# Patient Record
Sex: MALE | Race: WHITE | NOT HISPANIC OR LATINO | Employment: UNEMPLOYED
[De-identification: names, ages, dates, MRNs, and addresses within clinical notes are randomized per-mention and may not be internally consistent; named-entity substitution may affect disease eponyms.]

---

## 2017-11-06 ENCOUNTER — HOSPITAL (OUTPATIENT)
Dept: OTHER | Age: 52
End: 2017-11-06
Attending: INTERNAL MEDICINE

## 2018-01-02 ENCOUNTER — HOSPITAL (OUTPATIENT)
Dept: OTHER | Age: 53
End: 2018-01-02
Attending: INTERNAL MEDICINE

## 2018-01-02 LAB
ANALYZER ANC (IANC): ABNORMAL
ANION GAP SERPL CALC-SCNC: 15 MMOL/L (ref 10–20)
BASOPHILS # BLD: 0 THOUSAND/MCL (ref 0–0.3)
BASOPHILS NFR BLD: 0 %
BUN SERPL-MCNC: 13 MG/DL (ref 6–20)
BUN/CREAT SERPL: 15 (ref 7–25)
CALCIUM SERPL-MCNC: 8.7 MG/DL (ref 8.4–10.2)
CHLORIDE: 106 MMOL/L (ref 98–107)
CO2 SERPL-SCNC: 26 MMOL/L (ref 21–32)
CREAT SERPL-MCNC: 0.87 MG/DL (ref 0.67–1.17)
DIFFERENTIAL METHOD BLD: ABNORMAL
EOSINOPHIL # BLD: 0 THOUSAND/MCL (ref 0.1–0.5)
EOSINOPHIL NFR BLD: 0 %
ERYTHROCYTE [DISTWIDTH] IN BLOOD: 13 % (ref 11–15)
GLUCOSE SERPL-MCNC: 189 MG/DL (ref 65–99)
HEMATOCRIT: 43.6 % (ref 39–51)
HGB BLD-MCNC: 15.2 GM/DL (ref 13–17)
LYMPHOCYTES # BLD: 2.2 THOUSAND/MCL (ref 1–4)
LYMPHOCYTES NFR BLD: 20 %
MCH RBC QN AUTO: 33 PG (ref 26–34)
MCHC RBC AUTO-ENTMCNC: 34.9 GM/DL (ref 32–36.5)
MCV RBC AUTO: 94.6 FL (ref 78–100)
MONOCYTES # BLD: 0.3 THOUSAND/MCL (ref 0.3–0.9)
MONOCYTES NFR BLD: 3 %
NEUTROPHILS # BLD: 8.3 THOUSAND/MCL (ref 1.8–7.7)
NEUTS BAND NFR BLD: 5 % (ref 0–10)
NEUTS SEG NFR BLD: 72 %
PATH REV BLD -IMP: ABNORMAL
PLAT MORPH BLD: NORMAL
PLATELET # BLD: 338 THOUSAND/MCL (ref 140–450)
POLYCHROMASIA (POLY): ABNORMAL
POTASSIUM SERPL-SCNC: 4.6 MMOL/L (ref 3.4–5.1)
RBC # BLD: 4.61 MILLION/MCL (ref 4.5–5.9)
SODIUM SERPL-SCNC: 142 MMOL/L (ref 135–145)
TROPONIN I SERPL HS-MCNC: <0.02 NG/ML
WBC # BLD: 11.4 THOUSAND/MCL (ref 4.2–11)
WBC MORPH BLD: NORMAL

## 2018-01-03 ENCOUNTER — DIAGNOSTIC TRANS (OUTPATIENT)
Dept: OTHER | Age: 53
End: 2018-01-03

## 2018-01-03 LAB
ANALYZER ANC (IANC): ABNORMAL
BASOPHILS # BLD: 0 THOUSAND/MCL (ref 0–0.3)
BASOPHILS NFR BLD: 0 %
CHOLEST SERPL-MCNC: 141 MG/DL
CHOLEST/HDLC SERPL: 3.5 {RATIO}
DIFFERENTIAL METHOD BLD: ABNORMAL
EOSINOPHIL # BLD: 0 THOUSAND/MCL (ref 0.1–0.5)
EOSINOPHIL NFR BLD: 0 %
ERYTHROCYTE [DISTWIDTH] IN BLOOD: 12.9 % (ref 11–15)
GLUCOSE BLDC GLUCOMTR-MCNC: 99 MG/DL (ref 65–99)
GLYCOHEMOGLOBIN: 6.4 % (ref 4.5–5.6)
HDLC SERPL-MCNC: 40 MG/DL
HEMATOCRIT: 41.6 % (ref 39–51)
HGB BLD-MCNC: 14 GM/DL (ref 13–17)
LDLC SERPL CALC-MCNC: 73 MG/DL
LYMPHOCYTES # BLD: 4.8 THOUSAND/MCL (ref 1–4)
LYMPHOCYTES NFR BLD: 39 %
MCH RBC QN AUTO: 32.1 PG (ref 26–34)
MCHC RBC AUTO-ENTMCNC: 33.7 GM/DL (ref 32–36.5)
MCV RBC AUTO: 95.4 FL (ref 78–100)
MONOCYTES # BLD: 1 THOUSAND/MCL (ref 0.3–0.9)
MONOCYTES NFR BLD: 8 %
NEUTROPHILS # BLD: 6.5 THOUSAND/MCL (ref 1.8–7.7)
NEUTS SEG NFR BLD: 53 %
NONHDLC SERPL-MCNC: 101 MG/DL
PATH REV BLD -IMP: ABNORMAL
PLAT MORPH BLD: NORMAL
PLATELET # BLD: 263 THOUSAND/MCL (ref 140–450)
RBC # BLD: 4.36 MILLION/MCL (ref 4.5–5.9)
RBC MORPH BLD: NORMAL
TRIGLYCERIDE (TRIGP): 140 MG/DL
TROPONIN I SERPL HS-MCNC: <0.02 NG/ML
TROPONIN I SERPL HS-MCNC: <0.02 NG/ML
WBC # BLD: 12.2 THOUSAND/MCL (ref 4.2–11)
WBC MORPH BLD: NORMAL

## 2018-07-18 PROBLEM — B35.3 TINEA PEDIS OF BOTH FEET: Status: ACTIVE | Noted: 2018-07-18

## 2018-07-18 PROBLEM — E11.9 TYPE 2 DIABETES MELLITUS WITHOUT COMPLICATION, WITHOUT LONG-TERM CURRENT USE OF INSULIN (HCC): Status: ACTIVE | Noted: 2018-07-18

## 2018-07-18 PROBLEM — E78.5 HYPERLIPIDEMIA LDL GOAL <100: Status: ACTIVE | Noted: 2018-07-18

## 2018-07-18 PROBLEM — G47.33 OSA ON CPAP: Status: ACTIVE | Noted: 2018-07-18

## 2018-07-18 PROBLEM — Z99.89 OSA ON CPAP: Status: ACTIVE | Noted: 2018-07-18

## 2018-07-18 PROBLEM — I10 ESSENTIAL HYPERTENSION: Status: ACTIVE | Noted: 2018-07-18

## 2018-07-18 PROBLEM — Z00.00 ROUTINE GENERAL MEDICAL EXAMINATION AT A HEALTH CARE FACILITY: Status: ACTIVE | Noted: 2018-07-18

## 2018-07-18 PROCEDURE — 83036 HEMOGLOBIN GLYCOSYLATED A1C: CPT | Performed by: INTERNAL MEDICINE

## 2018-07-18 PROCEDURE — 84550 ASSAY OF BLOOD/URIC ACID: CPT | Performed by: INTERNAL MEDICINE

## 2018-07-18 PROCEDURE — 82570 ASSAY OF URINE CREATININE: CPT | Performed by: INTERNAL MEDICINE

## 2018-07-18 PROCEDURE — 36415 COLL VENOUS BLD VENIPUNCTURE: CPT | Performed by: INTERNAL MEDICINE

## 2018-07-18 PROCEDURE — 83721 ASSAY OF BLOOD LIPOPROTEIN: CPT | Performed by: INTERNAL MEDICINE

## 2018-07-18 PROCEDURE — 80061 LIPID PANEL: CPT | Performed by: INTERNAL MEDICINE

## 2018-07-18 PROCEDURE — 80050 GENERAL HEALTH PANEL: CPT | Performed by: INTERNAL MEDICINE

## 2018-07-18 PROCEDURE — 81003 URINALYSIS AUTO W/O SCOPE: CPT | Performed by: INTERNAL MEDICINE

## 2018-07-18 PROCEDURE — 82043 UR ALBUMIN QUANTITATIVE: CPT | Performed by: INTERNAL MEDICINE

## 2018-07-18 PROCEDURE — 84153 ASSAY OF PSA TOTAL: CPT | Performed by: INTERNAL MEDICINE

## 2018-09-06 PROBLEM — H90.42 SENSORINEURAL HEARING LOSS (SNHL) OF LEFT EAR WITH UNRESTRICTED HEARING OF RIGHT EAR: Status: ACTIVE | Noted: 2018-09-06

## 2018-09-15 ENCOUNTER — HOSPITAL (OUTPATIENT)
Dept: OTHER | Age: 53
End: 2018-09-15
Attending: EMERGENCY MEDICINE

## 2018-09-15 LAB
ANALYZER ANC (IANC): NORMAL
BASOPHILS # BLD: 0 THOUSAND/MCL (ref 0–0.3)
BASOPHILS NFR BLD: 0 %
DIFFERENTIAL METHOD BLD: NORMAL
EOSINOPHIL # BLD: 0.5 THOUSAND/MCL (ref 0.1–0.5)
EOSINOPHIL NFR BLD: 5 %
ERYTHROCYTE [DISTWIDTH] IN BLOOD: 12.5 % (ref 11–15)
HEMATOCRIT: 41.4 % (ref 39–51)
HGB BLD-MCNC: 14.8 GM/DL (ref 13–17)
LYMPHOCYTES # BLD: 3.6 THOUSAND/MCL (ref 1–4)
LYMPHOCYTES NFR BLD: 38 %
MCH RBC QN AUTO: 32.7 PG (ref 26–34)
MCHC RBC AUTO-ENTMCNC: 35.7 GM/DL (ref 32–36.5)
MCV RBC AUTO: 91.4 FL (ref 78–100)
MONOCYTES # BLD: 0.7 THOUSAND/MCL (ref 0.3–0.9)
MONOCYTES NFR BLD: 7 %
NEUTROPHILS # BLD: 4.7 THOUSAND/MCL (ref 1.8–7.7)
NEUTROPHILS NFR BLD: 50 %
NEUTS SEG NFR BLD: NORMAL %
NRBC (NRBCRE): NORMAL
PLATELET # BLD: 291 THOUSAND/MCL (ref 140–450)
RBC # BLD: 4.53 MILLION/MCL (ref 4.5–5.9)
WBC # BLD: 9.4 THOUSAND/MCL (ref 4.2–11)

## 2019-01-14 RX ORDER — ALBUTEROL SULFATE 90 UG/1
1 AEROSOL, METERED RESPIRATORY (INHALATION) EVERY 6 HOURS PRN
COMMUNITY
End: 2019-03-28

## 2019-01-17 ENCOUNTER — ANESTHESIA EVENT (OUTPATIENT)
Dept: ENDOSCOPY | Facility: HOSPITAL | Age: 54
End: 2019-01-17
Payer: COMMERCIAL

## 2019-01-18 ENCOUNTER — ANESTHESIA (OUTPATIENT)
Dept: ENDOSCOPY | Facility: HOSPITAL | Age: 54
End: 2019-01-18
Payer: COMMERCIAL

## 2019-01-18 ENCOUNTER — HOSPITAL ENCOUNTER (OUTPATIENT)
Facility: HOSPITAL | Age: 54
Setting detail: HOSPITAL OUTPATIENT SURGERY
Discharge: HOME | End: 2019-01-18
Attending: INTERNAL MEDICINE | Admitting: INTERNAL MEDICINE
Payer: COMMERCIAL

## 2019-01-18 VITALS
SYSTOLIC BLOOD PRESSURE: 141 MMHG | HEIGHT: 71 IN | BODY MASS INDEX: 39.76 KG/M2 | DIASTOLIC BLOOD PRESSURE: 85 MMHG | HEART RATE: 59 BPM | OXYGEN SATURATION: 98 % | RESPIRATION RATE: 18 BRPM | WEIGHT: 284 LBS

## 2019-01-18 DIAGNOSIS — K92.2 ACUTE GASTROINTESTINAL BLEEDING: ICD-10-CM

## 2019-01-18 LAB — GLUCOSE BLD-MCNC: 139 MG/DL (ref 65–99)

## 2019-01-18 PROCEDURE — 0DBP8ZX EXCISION OF RECTUM, VIA NATURAL OR ARTIFICIAL OPENING ENDOSCOPIC, DIAGNOSTIC: ICD-10-PCS | Performed by: INTERNAL MEDICINE

## 2019-01-18 PROCEDURE — 0DBN8ZX EXCISION OF SIGMOID COLON, VIA NATURAL OR ARTIFICIAL OPENING ENDOSCOPIC, DIAGNOSTIC: ICD-10-PCS | Performed by: INTERNAL MEDICINE

## 2019-01-18 PROCEDURE — 88305 TISSUE EXAM BY PATHOLOGIST: CPT | Performed by: INTERNAL MEDICINE

## 2019-01-18 PROCEDURE — 82962 GLUCOSE BLOOD TEST: CPT

## 2019-01-18 RX ORDER — DEXTROSE MONOHYDRATE 25 G/50ML
50 INJECTION, SOLUTION INTRAVENOUS
Status: DISCONTINUED | OUTPATIENT
Start: 2019-01-18 | End: 2019-01-18

## 2019-01-18 RX ORDER — SODIUM CHLORIDE, SODIUM LACTATE, POTASSIUM CHLORIDE, CALCIUM CHLORIDE 600; 310; 30; 20 MG/100ML; MG/100ML; MG/100ML; MG/100ML
INJECTION, SOLUTION INTRAVENOUS CONTINUOUS
Status: DISCONTINUED | OUTPATIENT
Start: 2019-01-18 | End: 2019-01-18

## 2019-01-18 NOTE — OPERATIVE REPORT
PATIENT NAME: Vamsi Moreira  MRN: [id number]  DATE OF OPERATION: 1/18/19  REFERRING PHYSICIAN: Dr. Jimbo Wise  Medications:  MAC  Date of last COLONOSCOPY:  2015  PREOPERATIVE DIAGNOSIS                personal history of colon polyps (2015)  POSTOPERATIVE IRWIN was 10 minutes. RECOMMENDATIONS         1. The patient will be provided with a written summary of today's endoscopic findings. 2. Fiber supplementation. 3. The patient will be notified with biopsy results in 1 - 2 weeks.          4. Recomme

## 2019-01-18 NOTE — H&P
Gastroenterology H and P  By Dr. Jania Lay  CC: personal history of colon polyps     HPI:  Tasneem Carrington is a 52year old male. Consult requested by Dr. Dong ref. provider found for a personal history of colon polyps.   Patient's last colonoscopy 2015, nota be held down during Colonoscopy   • COPD (chronic obstructive pulmonary disease) (HCC)    • Diabetes ([facility] [address]    • High blood pressure    • Visual impairment     reading glasses     Past Surgical History:   Procedure Laterality Date   • COLONOSCOPY     • TONS colonoscopy was 2015. Repeat colonoscopy indicated. Plan:  Colonoscopy will be scheduled within the next month or at the patient's earliest convenience. The risks of perforation, bleeding, and missed polyps or cancer were reviewed with the patient.

## 2019-01-18 NOTE — BRIEF OP NOTE
Pre-Operative Diagnosis: Acute gastrointestinal bleeding [K92.2]     Post-Operative Diagnosis: Colon polyp, diverticulosis, hemorrhoids      Procedure Performed:   Procedure(s):  COLONOSCOPY with cold snare polypectomy    Surgeon(s) and Role:     * Wendy

## 2019-01-18 NOTE — ANESTHESIA PREPROCEDURE EVALUATION
PRE-OP EVALUATION    Patient Name: Ellen Xiao    Pre-op Diagnosis: Acute gastrointestinal bleeding [K92.2]    Procedure(s):  COLONOSCOPY    Surgeon(s) and Role:     * Michele Atkinson MD - Primary    Pre-op vitals reviewed. Body mass index is 40. 4 Drug use: No     Available pre-op labs reviewed. Airway      Mallampati: III  Mouth opening: >3 FB  TM distance: 4 - 6 cm  Neck ROM: full Cardiovascular    Cardiovascular exam normal.         Dental    No notable dental history.

## 2019-01-18 NOTE — ANESTHESIA POSTPROCEDURE EVALUATION
[facility] [place]:  Hospital Outpatient Surgery   Age/Gender 52year old male MRN [id number]   Location [address] [place] [address]. Attending Tasha Gaytan MD   Hosp Day # 0 PCP Lakeisha Sánchez MD       Anesthesia Post-op Note

## 2019-04-17 ENCOUNTER — APPOINTMENT (OUTPATIENT)
Dept: GENERAL RADIOLOGY | Age: 54
End: 2019-04-17
Attending: FAMILY MEDICINE
Payer: COMMERCIAL

## 2019-04-17 ENCOUNTER — HOSPITAL ENCOUNTER (OUTPATIENT)
Age: 54
Discharge: HOME | End: 2019-04-17
Attending: FAMILY MEDICINE
Payer: COMMERCIAL

## 2019-04-17 VITALS
OXYGEN SATURATION: 99 % | RESPIRATION RATE: 20 BRPM | DIASTOLIC BLOOD PRESSURE: 79 MMHG | TEMPERATURE: 99 F | BODY MASS INDEX: 40.6 KG/M2 | WEIGHT: 290 LBS | HEART RATE: 71 BPM | HEIGHT: 71 IN | SYSTOLIC BLOOD PRESSURE: 139 MMHG

## 2019-04-17 DIAGNOSIS — M79.642 LEFT HAND PAIN: Primary | ICD-10-CM

## 2019-04-17 PROCEDURE — 36415 COLL VENOUS BLD VENIPUNCTURE: CPT

## 2019-04-17 PROCEDURE — 99203 OFFICE O/P NEW LOW 30 MIN: CPT

## 2019-04-17 PROCEDURE — 85025 COMPLETE CBC W/AUTO DIFF WBC: CPT | Performed by: FAMILY MEDICINE

## 2019-04-17 PROCEDURE — 73130 X-RAY EXAM OF HAND: CPT | Performed by: FAMILY MEDICINE

## 2019-04-17 RX ORDER — ACETAMINOPHEN 500 MG
1000 TABLET ORAL ONCE
Status: COMPLETED | OUTPATIENT
Start: 2019-04-17 | End: 2019-04-17

## 2019-04-17 RX ORDER — CEPHALEXIN 500 MG/1
500 CAPSULE ORAL 2 TIMES DAILY
Qty: 14 CAPSULE | Refills: 0 | Status: SHIPPED | OUTPATIENT
Start: 2019-04-17 | End: 2019-04-24

## 2019-04-17 NOTE — ED PROVIDER NOTES
Patient Seen in: [facility]    History   Patient presents with:  Hand Pain    Stated Complaint: l-hand pain    HPI  49yo M with a PMHx sig for Gastritis, COPD, Diabetes and HTN presents to IC with acute onset of pain and He appears well-developed. No distress. Cardiovascular: Normal rate and regular rhythm.    Pulmonary/Chest: Effort normal and breath sounds normal.   Musculoskeletal:        Left hand: He exhibits decreased range of motion (has full ROM of 2nd MCP but not for infectious tenosynovitis but will start on Keflex as a precaution and emphasized importance of f/u with Ortho tomorrow. Given contact info along with  if difficulty getting an appointment tomorrow. Continue with Tylenol.   If any new or wors

## 2019-04-17 NOTE — ED INITIAL ASSESSMENT (HPI)
Woke up this morning with pain to left hand at 2nd MCP joint. Feels \"hot\". Painful ROM. Swelling present. No trauma.

## 2019-04-28 PROBLEM — J45.20 MILD INTERMITTENT EXTRINSIC ASTHMA WITHOUT COMPLICATION: Status: ACTIVE | Noted: 2019-04-28

## 2019-04-28 PROBLEM — J32.4 CHRONIC PANSINUSITIS: Status: ACTIVE | Noted: 2019-04-28

## 2020-08-04 ENCOUNTER — HOSPITAL ENCOUNTER (OUTPATIENT)
Age: 55
Discharge: HOME | End: 2020-08-04
Payer: COMMERCIAL

## 2020-08-04 ENCOUNTER — APPOINTMENT (OUTPATIENT)
Dept: GENERAL RADIOLOGY | Age: 55
End: 2020-08-04
Attending: NURSE PRACTITIONER
Payer: COMMERCIAL

## 2020-08-04 VITALS
TEMPERATURE: 99 F | SYSTOLIC BLOOD PRESSURE: 152 MMHG | DIASTOLIC BLOOD PRESSURE: 97 MMHG | RESPIRATION RATE: 20 BRPM | OXYGEN SATURATION: 98 % | HEART RATE: 103 BPM

## 2020-08-04 DIAGNOSIS — R06.02 SHORTNESS OF BREATH: ICD-10-CM

## 2020-08-04 DIAGNOSIS — Z20.822 ENCOUNTER FOR SCREENING LABORATORY TESTING FOR COVID-19 VIRUS: Primary | ICD-10-CM

## 2020-08-04 PROCEDURE — 99214 OFFICE O/P EST MOD 30 MIN: CPT

## 2020-08-04 PROCEDURE — 99213 OFFICE O/P EST LOW 20 MIN: CPT

## 2020-08-04 PROCEDURE — 71046 X-RAY EXAM CHEST 2 VIEWS: CPT | Performed by: NURSE PRACTITIONER

## 2020-08-04 RX ORDER — ACETAMINOPHEN 500 MG
1000 TABLET ORAL ONCE
Status: COMPLETED | OUTPATIENT
Start: 2020-08-04 | End: 2020-08-04

## 2020-08-04 NOTE — ED PROVIDER NOTES
Patient Seen in: [address]      History   Patient presents with:  Breathing Problem    Stated Complaint: Copd difficulty breathing ha     HPI    This is a 49-year-old male with past medical history of COPD, sleep apnea, Copd difficulty breathing ha   Other systems are as noted in HPI. Constitutional and vital signs reviewed. All other systems reviewed and negative except as noted above.     Physical Exam     ED Triage Vitals [08/04/20 0813]   BP (!) 152/97   Pulse 10 diagnosis  COVID-19 infection versus bacterial viral pneumonia versus a viral syndrome. 76-year-old male nontoxic-appearing low-grade temp slightly tachycardic not hypoxic no respiratory distress presenting with shortness of breath.   Patient is on his l

## 2020-08-04 NOTE — ED INITIAL ASSESSMENT (HPI)
PATIENT ARRIVED AMBULATORY TO ROOM C/O SYMPTOMS THAT STARTED 1 WEEK AGO. PATIENT STATES HE SPOKE WITH HIS PULMONOLOGIST AND WAS PLACED ON A ZPAK. PATIENT STATES \"I FELT BETTER BUT THEN IT GOT BAD. MY DOCTOR TOLD ME TO COME GET TESTED\" +SOB. +HEADACHES.  +

## 2020-08-05 LAB — SARS-COV-2 RNA RESP QL NAA+PROBE: NOT DETECTED

## 2021-03-24 ENCOUNTER — HOSPITAL ENCOUNTER (OUTPATIENT)
Age: 56
Discharge: OTHER INSTITUTION | End: 2021-03-24
Attending: EMERGENCY MEDICINE
Payer: COMMERCIAL

## 2021-03-24 ENCOUNTER — HOSPITAL ENCOUNTER (EMERGENCY)
Age: 56
Discharge: HOME | End: 2021-03-24
Attending: EMERGENCY MEDICINE

## 2021-03-24 ENCOUNTER — APPOINTMENT (OUTPATIENT)
Dept: GENERAL RADIOLOGY | Age: 56
End: 2021-03-24
Attending: EMERGENCY MEDICINE

## 2021-03-24 ENCOUNTER — APPOINTMENT (OUTPATIENT)
Dept: CT IMAGING | Age: 56
End: 2021-03-24
Attending: EMERGENCY MEDICINE
Payer: COMMERCIAL

## 2021-03-24 VITALS
SYSTOLIC BLOOD PRESSURE: 128 MMHG | RESPIRATION RATE: 14 BRPM | TEMPERATURE: 98.1 F | OXYGEN SATURATION: 97 % | WEIGHT: 293.65 LBS | HEART RATE: 83 BPM | DIASTOLIC BLOOD PRESSURE: 74 MMHG

## 2021-03-24 VITALS
OXYGEN SATURATION: 98 % | HEART RATE: 74 BPM | RESPIRATION RATE: 18 BRPM | TEMPERATURE: 98 F | DIASTOLIC BLOOD PRESSURE: 80 MMHG | SYSTOLIC BLOOD PRESSURE: 142 MMHG

## 2021-03-24 DIAGNOSIS — E86.0 DEHYDRATION: ICD-10-CM

## 2021-03-24 DIAGNOSIS — K52.9 AGE (ACUTE GASTROENTERITIS): Primary | ICD-10-CM

## 2021-03-24 DIAGNOSIS — R10.9 ABDOMINAL PAIN OF UNKNOWN ETIOLOGY: ICD-10-CM

## 2021-03-24 DIAGNOSIS — R55 SYNCOPE AND COLLAPSE: Primary | ICD-10-CM

## 2021-03-24 LAB
#MXD IC: 1.1 X10ˆ3/UL (ref 0.1–1)
ALBUMIN SERPL-MCNC: 3.7 G/DL (ref 3.6–5.1)
ALBUMIN/GLOB SERPL: 1 {RATIO} (ref 1–2.4)
ALP SERPL-CCNC: 64 UNITS/L (ref 45–117)
ALT SERPL-CCNC: 59 UNITS/L
ANION GAP SERPL CALC-SCNC: 12 MMOL/L (ref 10–20)
APPEARANCE UR: CLEAR
AST SERPL-CCNC: 33 UNITS/L
ATRIAL RATE (BPM): 72
BACTERIA #/AREA URNS HPF: ABNORMAL /HPF
BASOPHILS # BLD: 0 K/MCL (ref 0–0.3)
BASOPHILS NFR BLD: 0 %
BILIRUB SERPL-MCNC: 0.4 MG/DL (ref 0.2–1)
BILIRUB UR QL STRIP: NEGATIVE
BILIRUB UR QL STRIP: NEGATIVE
BUN SERPL-MCNC: 14 MG/DL (ref 6–20)
BUN/CREAT SERPL: 17 (ref 7–25)
CALCIUM SERPL-MCNC: 8.4 MG/DL (ref 8.4–10.2)
CHLORIDE SERPL-SCNC: 105 MMOL/L (ref 98–107)
CLARITY UR: CLEAR
CO2 SERPL-SCNC: 24 MMOL/L (ref 21–32)
COLOR UR: YELLOW
CREAT BLD-MCNC: 0.8 MG/DL
CREAT SERPL-MCNC: 0.82 MG/DL (ref 0.67–1.17)
DEPRECATED RDW RBC: 41.8 FL (ref 39–50)
EOSINOPHIL # BLD: 0 K/MCL (ref 0–0.5)
EOSINOPHIL NFR BLD: 0 %
ERYTHROCYTE [DISTWIDTH] IN BLOOD: 12.3 % (ref 11–15)
FASTING DURATION TIME PATIENT: ABNORMAL H
GFR SERPLBLD BASED ON 1.73 SQ M-ARVRAT: >90 ML/MIN/1.73M2
GLOBULIN SER-MCNC: 3.8 G/DL (ref 2–4)
GLUCOSE BLD-MCNC: 151 MG/DL (ref 70–99)
GLUCOSE BLDC GLUCOMTR-MCNC: 126 MG/DL (ref 70–99)
GLUCOSE SERPL-MCNC: 210 MG/DL (ref 65–99)
GLUCOSE UR STRIP-MCNC: NEGATIVE MG/DL
GLUCOSE UR STRIP-MCNC: NEGATIVE MG/DL
HCT VFR BLD AUTO: 44.2 %
HCT VFR BLD CALC: 43 % (ref 39–51)
HGB BLD-MCNC: 14.7 G/DL (ref 13–17)
HGB BLD-MCNC: 15.2 G/DL
HGB UR QL STRIP: NEGATIVE
HGB UR QL STRIP: NEGATIVE
HYALINE CASTS #/AREA URNS LPF: ABNORMAL /LPF
IMM GRANULOCYTES # BLD AUTO: 0.1 K/MCL (ref 0–0.2)
IMM GRANULOCYTES # BLD: 1 %
ISTAT BUN: 13 MG/DL (ref 7–18)
ISTAT CHLORIDE: 100 MMOL/L (ref 98–112)
ISTAT HEMATOCRIT: 48 %
ISTAT IONIZED CALCIUM FOR CHEM 8: 1.2 MMOL/L (ref 1.12–1.32)
ISTAT POTASSIUM: 4 MMOL/L (ref 3.6–5.1)
ISTAT SODIUM: 140 MMOL/L (ref 136–145)
ISTAT TCO2: 28 MMOL/L (ref 21–32)
KETONES UR STRIP-MCNC: 80 MG/DL
KETONES UR STRIP-MCNC: NEGATIVE MG/DL
LEUKOCYTE ESTERASE UR QL STRIP: ABNORMAL
LEUKOCYTE ESTERASE UR QL STRIP: NEGATIVE
LIPASE SERPL-CCNC: 59 UNITS/L (ref 73–393)
LYMPHOCYTES # BLD AUTO: 4.6 X10ˆ3/UL (ref 1–4)
LYMPHOCYTES # BLD: 1.9 K/MCL (ref 1–4)
LYMPHOCYTES NFR BLD AUTO: 33.8 %
LYMPHOCYTES NFR BLD: 12 %
MAGNESIUM SERPL-MCNC: 1.7 MG/DL (ref 1.7–2.4)
MCH RBC QN AUTO: 31.5 PG (ref 26–34)
MCH RBC QN AUTO: 31.5 PG (ref 26–34)
MCHC RBC AUTO-ENTMCNC: 34.2 G/DL (ref 32–36.5)
MCHC RBC AUTO-ENTMCNC: 34.4 G/DL (ref 31–37)
MCV RBC AUTO: 91.5 FL (ref 80–100)
MCV RBC AUTO: 92.1 FL (ref 78–100)
MIXED CELL %: 8.3 %
MONOCYTES # BLD: 0.9 K/MCL (ref 0.3–0.9)
MONOCYTES NFR BLD: 6 %
MUCOUS THREADS URNS QL MICRO: PRESENT
NEUTROPHILS # BLD AUTO: 7.9 X10ˆ3/UL (ref 1.5–7.7)
NEUTROPHILS # BLD: 12.5 K/MCL (ref 1.8–7.7)
NEUTROPHILS NFR BLD AUTO: 57.9 %
NEUTROPHILS NFR BLD: 81 %
NITRITE UR QL STRIP: NEGATIVE
NITRITE UR QL STRIP: NEGATIVE
NRBC BLD MANUAL-RTO: 0 /100 WBC
P AXIS (DEGREES): 64
PH UR STRIP: 5 [PH] (ref 5–7)
PH UR STRIP: 5.5 [PH]
PLATELET # BLD AUTO: 314 K/MCL (ref 140–450)
PLATELET # BLD AUTO: 316 X10ˆ3/UL (ref 150–450)
POTASSIUM SERPL-SCNC: 3.6 MMOL/L (ref 3.4–5.1)
PR-INTERVAL (MSEC): 176
PROT SERPL-MCNC: 7.5 G/DL (ref 6.4–8.2)
PROT UR STRIP-MCNC: 30 MG/DL
PROT UR STRIP-MCNC: NEGATIVE MG/DL
QRS-INTERVAL (MSEC): 98
QT-INTERVAL (MSEC): 434
QTC: 475
R AXIS (DEGREES): 0
RBC # BLD AUTO: 4.83 X10ˆ6/UL
RBC # BLD: 4.67 MIL/MCL (ref 4.5–5.9)
RBC #/AREA URNS HPF: ABNORMAL /HPF
REPORT TEXT: NORMAL
SODIUM SERPL-SCNC: 137 MMOL/L (ref 135–145)
SP GR UR STRIP: 1.02
SP GR UR STRIP: 1.02 (ref 1–1.03)
SQUAMOUS #/AREA URNS HPF: ABNORMAL /HPF
T AXIS (DEGREES): 32
TROPONIN I SERPL HS-MCNC: <0.02 NG/ML
TROPONIN I SERPL HS-MCNC: <0.02 NG/ML
UROBILINOGEN UR STRIP-ACNC: <2 MG/DL
UROBILINOGEN UR STRIP-MCNC: 0.2 MG/DL
VENTRICULAR RATE EKG/MIN (BPM): 72
WBC # BLD AUTO: 13.6 X10ˆ3/UL (ref 4–11)
WBC # BLD: 15.5 K/MCL (ref 4.2–11)
WBC #/AREA URNS HPF: ABNORMAL /HPF

## 2021-03-24 PROCEDURE — 93005 ELECTROCARDIOGRAM TRACING: CPT

## 2021-03-24 PROCEDURE — C9803 HOPD COVID-19 SPEC COLLECT: HCPCS

## 2021-03-24 PROCEDURE — 93010 ELECTROCARDIOGRAM REPORT: CPT

## 2021-03-24 PROCEDURE — 83690 ASSAY OF LIPASE: CPT | Performed by: EMERGENCY MEDICINE

## 2021-03-24 PROCEDURE — 85025 COMPLETE CBC W/AUTO DIFF WBC: CPT | Performed by: EMERGENCY MEDICINE

## 2021-03-24 PROCEDURE — 82962 GLUCOSE BLOOD TEST: CPT

## 2021-03-24 PROCEDURE — 93010 ELECTROCARDIOGRAM REPORT: CPT | Performed by: EMERGENCY MEDICINE

## 2021-03-24 PROCEDURE — U0003 INFECTIOUS AGENT DETECTION BY NUCLEIC ACID (DNA OR RNA); SEVERE ACUTE RESPIRATORY SYNDROME CORONAVIRUS 2 (SARS-COV-2) (CORONAVIRUS DISEASE [COVID-19]), AMPLIFIED PROBE TECHNIQUE, MAKING USE OF HIGH THROUGHPUT TECHNOLOGIES AS DESCRIBED BY CMS-2020-01-R: HCPCS | Performed by: EMERGENCY MEDICINE

## 2021-03-24 PROCEDURE — 72193 CT PELVIS W/DYE: CPT | Performed by: EMERGENCY MEDICINE

## 2021-03-24 PROCEDURE — 81002 URINALYSIS NONAUTO W/O SCOPE: CPT

## 2021-03-24 PROCEDURE — 10002800 HB RX 250 W HCPCS: Performed by: EMERGENCY MEDICINE

## 2021-03-24 PROCEDURE — 80053 COMPREHEN METABOLIC PANEL: CPT | Performed by: EMERGENCY MEDICINE

## 2021-03-24 PROCEDURE — 71045 X-RAY EXAM CHEST 1 VIEW: CPT

## 2021-03-24 PROCEDURE — 10002803 HB RX 637: Performed by: EMERGENCY MEDICINE

## 2021-03-24 PROCEDURE — 84484 ASSAY OF TROPONIN QUANT: CPT | Performed by: EMERGENCY MEDICINE

## 2021-03-24 PROCEDURE — 81001 URINALYSIS AUTO W/SCOPE: CPT | Performed by: EMERGENCY MEDICINE

## 2021-03-24 PROCEDURE — 83735 ASSAY OF MAGNESIUM: CPT | Performed by: EMERGENCY MEDICINE

## 2021-03-24 PROCEDURE — 93005 ELECTROCARDIOGRAM TRACING: CPT | Performed by: EMERGENCY MEDICINE

## 2021-03-24 PROCEDURE — 10002807 HB RX 258: Performed by: EMERGENCY MEDICINE

## 2021-03-24 PROCEDURE — 99215 OFFICE O/P EST HI 40 MIN: CPT

## 2021-03-24 PROCEDURE — 80047 BASIC METABLC PNL IONIZED CA: CPT

## 2021-03-24 PROCEDURE — 87086 URINE CULTURE/COLONY COUNT: CPT | Performed by: EMERGENCY MEDICINE

## 2021-03-24 PROCEDURE — 99284 EMERGENCY DEPT VISIT MOD MDM: CPT

## 2021-03-24 PROCEDURE — 96361 HYDRATE IV INFUSION ADD-ON: CPT

## 2021-03-24 PROCEDURE — 96374 THER/PROPH/DIAG INJ IV PUSH: CPT

## 2021-03-24 PROCEDURE — 99214 OFFICE O/P EST MOD 30 MIN: CPT

## 2021-03-24 RX ORDER — SODIUM CHLORIDE 9 MG/ML
1000 INJECTION, SOLUTION INTRAVENOUS ONCE
Status: COMPLETED | OUTPATIENT
Start: 2021-03-24 | End: 2021-03-24

## 2021-03-24 RX ORDER — ONDANSETRON 4 MG/1
4 TABLET, ORALLY DISINTEGRATING ORAL EVERY 8 HOURS PRN
Qty: 12 TABLET | Refills: 0 | Status: SHIPPED | OUTPATIENT
Start: 2021-03-24

## 2021-03-24 RX ORDER — ONDANSETRON 2 MG/ML
4 INJECTION INTRAMUSCULAR; INTRAVENOUS ONCE
Status: COMPLETED | OUTPATIENT
Start: 2021-03-24 | End: 2021-03-24

## 2021-03-24 RX ORDER — KETOROLAC TROMETHAMINE 30 MG/ML
15 INJECTION, SOLUTION INTRAMUSCULAR; INTRAVENOUS ONCE
Status: COMPLETED | OUTPATIENT
Start: 2021-03-24 | End: 2021-03-24

## 2021-03-24 RX ORDER — LORATADINE 10 MG/1
10 TABLET ORAL ONCE
Status: COMPLETED | OUTPATIENT
Start: 2021-03-24 | End: 2021-03-24

## 2021-03-24 RX ORDER — SODIUM CHLORIDE 9 MG/ML
INJECTION, SOLUTION INTRAVENOUS CONTINUOUS
Status: DISCONTINUED | OUTPATIENT
Start: 2021-03-24 | End: 2021-03-24 | Stop reason: HOSPADM

## 2021-03-24 RX ADMIN — SODIUM CHLORIDE: 0.9 INJECTION, SOLUTION INTRAVENOUS at 17:36

## 2021-03-24 RX ADMIN — LORATADINE 10 MG: 10 TABLET ORAL at 19:03

## 2021-03-24 RX ADMIN — ONDANSETRON 4 MG: 2 INJECTION INTRAMUSCULAR; INTRAVENOUS at 17:36

## 2021-03-24 RX ADMIN — SODIUM CHLORIDE 1000 ML: 0.9 INJECTION, SOLUTION INTRAVENOUS at 17:36

## 2021-03-24 ASSESSMENT — ENCOUNTER SYMPTOMS
HEMATOLOGIC/LYMPHATIC NEGATIVE: 1
ALLERGIC/IMMUNOLOGIC NEGATIVE: 1
ABDOMINAL PAIN: 1
PSYCHIATRIC NEGATIVE: 1
CONSTITUTIONAL NEGATIVE: 1
COUGH: 1
RHINORRHEA: 1
DIARRHEA: 1
NEUROLOGICAL NEGATIVE: 1
EYES NEGATIVE: 1
VOMITING: 1

## 2021-03-24 ASSESSMENT — PAIN SCALES - GENERAL: PAINLEVEL_OUTOF10: 2

## 2021-03-24 NOTE — ED NOTES
Medics here. Patient transported to [address] [place] [address] ED for further evaluation.

## 2021-03-24 NOTE — ED PROVIDER NOTES
Patient Seen in: [facility]      History   Patient presents with:  Abdominal Pain    Stated Complaint: diverticulitis acting up    HPI/Subjective:   HPI    66-year-old male presents for evaluation for diverticulitis.   Patient states that for Exam     ED Triage Vitals [03/24/21 1251]   /81   Pulse 81   Resp 18   Temp 97.5 °F (36.4 °C)   Temp src Oral   SpO2 98 %   O2 Device None (Room air)       Current:/81   Pulse 81   Temp 97.5 °F (36.4 °C) (Oral)   Resp 18   SpO2 98%         Phys Sinus Rhythm  Reading: No STEMI. Interpreted by ED physician. MDM    CBC with leukocytosis. BMP unremarkable. Urinalysis unremarkable.   Patient was unable to tolerate the full CT abdomen pelvis given his claustrophobia so only a CT pelvis was attenuation seen at the edge of the field of view. Calcified gallstone.   1.1 cm and a 2.5 cm simple right renal cyst.  A incompletely characterized hypoattenuating (33 Hounsfield units) exophytic anterior left lower pole renal lesion measuring 1 cm and ha information from the family, and speaking with consultants, admitting doctors, nurses and medics and excludes any time spent on procedures.                                    Disposition and Plan     Clinical Impression:  Syncope and collapse  (primary enco

## 2021-03-24 NOTE — ED INITIAL ASSESSMENT (HPI)
PATIENT ARRIVED AMBULATORY TO ROOM C/O LLQ ABDOMINAL PAIN THAT STARTED 2 DAYS AGO. PATIENT STATES HE HAS H/O DIVERTICULITIS, LAST ATTACK WAS 7 YEARS AND PATIENT WAS HOSPITALIZED. NO FEVERS. NO N/V/D. NO DISTRESS.

## 2021-03-24 NOTE — ED NOTES
Patient climbing off of cart stating his \"sugar is dropping rapidly\". Feels lightheaded, diaphoretic, and pale. Assisted to cart. Accu check = 126. Repeatedly stating he knows his sugar is low. HR= 50. Dr. De La O Certain to bedside.  Patient became nauseated a

## 2021-03-25 LAB
RAINBOW EXTRA TUBES HOLD SPECIMEN: NORMAL
SARS-COV-2 RNA RESP QL NAA+PROBE: NOT DETECTED
SERVICE CMNT-IMP: NORMAL
SERVICE CMNT-IMP: NORMAL

## 2021-03-26 LAB — BACTERIA UR CULT: NORMAL

## 2021-12-01 PROBLEM — J01.01 ACUTE RECURRENT MAXILLARY SINUSITIS: Status: ACTIVE | Noted: 2021-12-01

## 2021-12-01 PROBLEM — H92.09 EAR ACHE: Status: ACTIVE | Noted: 2021-12-01

## (undated) DEVICE — 1200CC GUARDIAN II: Brand: GUARDIAN

## (undated) DEVICE — SNARE CAPTIFLEX MICRO-OVL OLY

## (undated) DEVICE — FILTERLINE NASAL ADULT O2/CO2

## (undated) DEVICE — 3M™ RED DOT™ MONITORING ELECTRODE WITH FOAM TAPE AND STICKY GEL, 50/BAG, 20/CASE, 72/PLT 2570: Brand: RED DOT™

## (undated) DEVICE — ENDOSCOPY PACK - LOWER: Brand: MEDLINE INDUSTRIES, INC.

## (undated) DEVICE — Device: Brand: DEFENDO AIR/WATER/SUCTION AND BIOPSY VALVE

## (undated) DEVICE — TRAP 4 CPTR CHMBR N EZ INLN

## (undated) NOTE — LETTER
1/22/2019          Juana Vincent  [id number]    Dear Enid Suarez,       Here are the  biopsy/pathology findings from your recent Colonoscopy : The sigmoid colon polyp was a benign adenomatous polyp.    The rectal polyp was a hype

## (undated) NOTE — LETTER
Stephany Thompson [address]  [place], [address] [facility]    Consent for Operation  Date: __________________                                Time: _______________    1.  I authorize the performance upon Desire Anderson the following operation:  Procedure(s) videotape. The [facility] will not be responsible for storage or maintenance of this tape. 7. For the purpose of advancing medical education, I consent to the admittance of observers to the Operating Room.   8. I authorize the use of any specimen, organs, ti When the patient is a minor or mentally incompetent to give consent:  Signature of person authorized to consent for patient: ___________________________   Relationship to patient: ____________________________________________________    Signature of Witness these medicines may increase my risk of anesthetic complications. iv. If I am allergic to anything or have had a reaction to anesthesia before. 3. I understand how the anesthesia medicine will help me (benefits).   4. I understand that with any type of an patient’s representative) and answered their questions. The patient or their representative has agreed to have anesthesia services.     _____________________________________________________________________________  Witness        Date   Time  I have charisse